# Patient Record
Sex: MALE | Race: WHITE | NOT HISPANIC OR LATINO | Employment: UNEMPLOYED | URBAN - METROPOLITAN AREA
[De-identification: names, ages, dates, MRNs, and addresses within clinical notes are randomized per-mention and may not be internally consistent; named-entity substitution may affect disease eponyms.]

---

## 2023-10-16 PROBLEM — M86.9 OSTEOMYELITIS OF TOE OF LEFT FOOT: Status: ACTIVE | Noted: 2018-01-24

## 2023-10-16 PROBLEM — E66.3 OVERWEIGHT (BMI 25.0-29.9): Status: ACTIVE | Noted: 2023-01-11

## 2023-10-16 PROBLEM — N18.4 CKD (CHRONIC KIDNEY DISEASE), STAGE IV: Status: ACTIVE | Noted: 2021-10-11

## 2023-10-16 PROBLEM — L03.031 CELLULITIS AND ABSCESS OF TOE OF RIGHT FOOT: Status: ACTIVE | Noted: 2021-10-07

## 2023-10-16 PROBLEM — S98.139A AMPUTATION OF TOE: Status: ACTIVE | Noted: 2021-10-07

## 2023-10-16 PROBLEM — E11.42 DIABETIC POLYNEUROPATHY ASSOCIATED WITH TYPE 2 DIABETES MELLITUS: Status: ACTIVE | Noted: 2023-10-16

## 2023-10-16 PROBLEM — N18.9 ACUTE KIDNEY INJURY SUPERIMPOSED ON CHRONIC KIDNEY DISEASE: Status: ACTIVE | Noted: 2023-01-11

## 2023-10-16 PROBLEM — E78.2 MIXED HYPERLIPIDEMIA: Status: ACTIVE | Noted: 2022-03-14

## 2023-10-16 PROBLEM — I21.4 NSTEMI (NON-ST ELEVATED MYOCARDIAL INFARCTION): Status: ACTIVE | Noted: 2023-01-11

## 2023-10-16 PROBLEM — D64.9 ANEMIA: Status: ACTIVE | Noted: 2018-01-24

## 2023-10-16 PROBLEM — I10 UNCONTROLLED HYPERTENSION: Status: ACTIVE | Noted: 2021-11-09

## 2023-10-16 PROBLEM — I16.0 HYPERTENSIVE URGENCY: Status: ACTIVE | Noted: 2023-10-16

## 2023-10-16 PROBLEM — N18.9 CKD (CHRONIC KIDNEY DISEASE): Status: ACTIVE | Noted: 2021-10-11

## 2023-10-16 PROBLEM — D63.8 ANEMIA OF CHRONIC DISEASE: Status: ACTIVE | Noted: 2018-01-24

## 2023-10-16 PROBLEM — J18.9 PNEUMONIA OF RIGHT LOWER LOBE DUE TO INFECTIOUS ORGANISM: Status: ACTIVE | Noted: 2023-10-16

## 2023-10-16 PROBLEM — I50.9 CHF EXACERBATION: Status: ACTIVE | Noted: 2023-01-11

## 2023-10-16 PROBLEM — L02.611 CELLULITIS AND ABSCESS OF TOE OF RIGHT FOOT: Status: ACTIVE | Noted: 2021-10-07

## 2023-10-16 PROBLEM — N17.9 ACUTE KIDNEY INJURY SUPERIMPOSED ON CHRONIC KIDNEY DISEASE: Status: ACTIVE | Noted: 2023-01-11

## 2023-10-17 PROBLEM — N18.4 CKD (CHRONIC KIDNEY DISEASE) STAGE 4, GFR 15-29 ML/MIN: Status: ACTIVE | Noted: 2023-10-17

## 2023-10-17 PROBLEM — I50.33 ACUTE ON CHRONIC DIASTOLIC HEART FAILURE: Status: ACTIVE | Noted: 2023-01-11

## 2023-10-17 PROBLEM — E87.5 HYPERKALEMIA: Status: ACTIVE | Noted: 2023-10-17

## 2023-10-17 PROBLEM — J18.9 PNEUMONIA OF RIGHT LOWER LOBE DUE TO INFECTIOUS ORGANISM: Status: RESOLVED | Noted: 2023-10-16 | Resolved: 2023-10-17

## 2023-10-17 PROBLEM — I16.0 HYPERTENSIVE URGENCY: Status: RESOLVED | Noted: 2023-10-16 | Resolved: 2023-10-17

## 2023-10-17 PROBLEM — I50.9 ACUTE ON CHRONIC CONGESTIVE HEART FAILURE: Status: ACTIVE | Noted: 2023-10-17

## 2023-10-18 PROBLEM — N18.4 CKD (CHRONIC KIDNEY DISEASE) STAGE 4, GFR 15-29 ML/MIN: Status: RESOLVED | Noted: 2023-10-17 | Resolved: 2023-10-18

## 2023-10-18 PROBLEM — I50.9 ACUTE ON CHRONIC CONGESTIVE HEART FAILURE: Status: RESOLVED | Noted: 2023-10-17 | Resolved: 2023-10-18

## 2023-10-19 PROBLEM — I50.33 ACUTE ON CHRONIC DIASTOLIC HEART FAILURE: Status: RESOLVED | Noted: 2023-01-11 | Resolved: 2023-10-19

## 2023-10-19 PROBLEM — E87.5 HYPERKALEMIA: Status: RESOLVED | Noted: 2023-10-17 | Resolved: 2023-10-19

## 2023-10-20 ENCOUNTER — PATIENT OUTREACH (OUTPATIENT)
Dept: ADMINISTRATIVE | Facility: CLINIC | Age: 58
End: 2023-10-20

## 2023-10-20 NOTE — PROGRESS NOTES
C3 nurse spoke with Greg Kaba for a TCC post hospital discharge follow up call. The patient has a scheduled HOSFU appointment with Ralph Gimenez MD on 10/31/23 @ 0000.

## 2024-01-15 PROBLEM — N17.9 ACUTE KIDNEY INJURY SUPERIMPOSED ON CHRONIC KIDNEY DISEASE: Status: RESOLVED | Noted: 2023-01-11 | Resolved: 2024-01-15

## 2024-01-15 PROBLEM — N18.9 ACUTE KIDNEY INJURY SUPERIMPOSED ON CHRONIC KIDNEY DISEASE: Status: RESOLVED | Noted: 2023-01-11 | Resolved: 2024-01-15

## 2024-01-15 PROBLEM — I21.4 NSTEMI (NON-ST ELEVATED MYOCARDIAL INFARCTION): Status: RESOLVED | Noted: 2023-01-11 | Resolved: 2024-01-15
